# Patient Record
Sex: FEMALE | Race: WHITE | NOT HISPANIC OR LATINO
[De-identification: names, ages, dates, MRNs, and addresses within clinical notes are randomized per-mention and may not be internally consistent; named-entity substitution may affect disease eponyms.]

---

## 2017-01-03 ENCOUNTER — APPOINTMENT (OUTPATIENT)
Dept: ORTHOPEDIC SURGERY | Facility: CLINIC | Age: 62
End: 2017-01-03

## 2017-10-04 ENCOUNTER — APPOINTMENT (OUTPATIENT)
Dept: ULTRASOUND IMAGING | Facility: IMAGING CENTER | Age: 62
End: 2017-10-04
Payer: COMMERCIAL

## 2017-10-04 ENCOUNTER — OUTPATIENT (OUTPATIENT)
Dept: OUTPATIENT SERVICES | Facility: HOSPITAL | Age: 62
LOS: 1 days | End: 2017-10-04
Payer: COMMERCIAL

## 2017-10-04 ENCOUNTER — APPOINTMENT (OUTPATIENT)
Dept: MAMMOGRAPHY | Facility: IMAGING CENTER | Age: 62
End: 2017-10-04
Payer: COMMERCIAL

## 2017-10-04 DIAGNOSIS — Z00.8 ENCOUNTER FOR OTHER GENERAL EXAMINATION: ICD-10-CM

## 2017-10-04 PROCEDURE — G0202: CPT | Mod: 26

## 2017-10-04 PROCEDURE — 76641 ULTRASOUND BREAST COMPLETE: CPT

## 2017-10-04 PROCEDURE — 76641 ULTRASOUND BREAST COMPLETE: CPT | Mod: 26,50

## 2017-10-04 PROCEDURE — 77067 SCR MAMMO BI INCL CAD: CPT

## 2017-10-04 PROCEDURE — 77063 BREAST TOMOSYNTHESIS BI: CPT | Mod: 26

## 2017-10-04 PROCEDURE — 77063 BREAST TOMOSYNTHESIS BI: CPT

## 2017-10-25 ENCOUNTER — RESULT REVIEW (OUTPATIENT)
Age: 62
End: 2017-10-25

## 2018-02-09 ENCOUNTER — TRANSCRIPTION ENCOUNTER (OUTPATIENT)
Age: 63
End: 2018-02-09

## 2018-06-21 ENCOUNTER — EMERGENCY (EMERGENCY)
Facility: HOSPITAL | Age: 63
LOS: 1 days | End: 2018-06-21
Attending: EMERGENCY MEDICINE
Payer: COMMERCIAL

## 2018-06-21 VITALS
DIASTOLIC BLOOD PRESSURE: 72 MMHG | HEART RATE: 103 BPM | RESPIRATION RATE: 22 BRPM | OXYGEN SATURATION: 97 % | SYSTOLIC BLOOD PRESSURE: 115 MMHG

## 2018-06-21 LAB
ALBUMIN SERPL ELPH-MCNC: 4.3 G/DL — SIGNIFICANT CHANGE UP (ref 3.3–5)
ALP SERPL-CCNC: 83 U/L — SIGNIFICANT CHANGE UP (ref 40–120)
ALT FLD-CCNC: 20 U/L — SIGNIFICANT CHANGE UP (ref 10–45)
ANION GAP SERPL CALC-SCNC: 10 MMOL/L — SIGNIFICANT CHANGE UP (ref 5–17)
APPEARANCE UR: CLEAR — SIGNIFICANT CHANGE UP
APTT BLD: 28.3 SEC — SIGNIFICANT CHANGE UP (ref 27.5–37.4)
AST SERPL-CCNC: 26 U/L — SIGNIFICANT CHANGE UP (ref 10–40)
BASOPHILS # BLD AUTO: 0.1 K/UL — SIGNIFICANT CHANGE UP (ref 0–0.2)
BASOPHILS NFR BLD AUTO: 1.1 % — SIGNIFICANT CHANGE UP (ref 0–2)
BILIRUB SERPL-MCNC: 0.4 MG/DL — SIGNIFICANT CHANGE UP (ref 0.2–1.2)
BILIRUB UR-MCNC: NEGATIVE — SIGNIFICANT CHANGE UP
BUN SERPL-MCNC: 24 MG/DL — HIGH (ref 7–23)
CALCIUM SERPL-MCNC: 9.3 MG/DL — SIGNIFICANT CHANGE UP (ref 8.4–10.5)
CHLORIDE SERPL-SCNC: 103 MMOL/L — SIGNIFICANT CHANGE UP (ref 96–108)
CO2 SERPL-SCNC: 29 MMOL/L — SIGNIFICANT CHANGE UP (ref 22–31)
COLOR SPEC: SIGNIFICANT CHANGE UP
CREAT SERPL-MCNC: 0.92 MG/DL — SIGNIFICANT CHANGE UP (ref 0.5–1.3)
DIFF PNL FLD: ABNORMAL
EOSINOPHIL # BLD AUTO: 0.1 K/UL — SIGNIFICANT CHANGE UP (ref 0–0.5)
EOSINOPHIL NFR BLD AUTO: 2.8 % — SIGNIFICANT CHANGE UP (ref 0–6)
ETHANOL SERPL-MCNC: SIGNIFICANT CHANGE UP MG/DL (ref 0–10)
GAS PNL BLDV: SIGNIFICANT CHANGE UP
GLUCOSE SERPL-MCNC: 105 MG/DL — HIGH (ref 70–99)
GLUCOSE UR QL: NEGATIVE — SIGNIFICANT CHANGE UP
HCT VFR BLD CALC: 42.1 % — SIGNIFICANT CHANGE UP (ref 34.5–45)
HGB BLD-MCNC: 14.7 G/DL — SIGNIFICANT CHANGE UP (ref 11.5–15.5)
INR BLD: 1.01 RATIO — SIGNIFICANT CHANGE UP (ref 0.88–1.16)
KETONES UR-MCNC: NEGATIVE — SIGNIFICANT CHANGE UP
LEUKOCYTE ESTERASE UR-ACNC: NEGATIVE — SIGNIFICANT CHANGE UP
LYMPHOCYTES # BLD AUTO: 1.2 K/UL — SIGNIFICANT CHANGE UP (ref 1–3.3)
LYMPHOCYTES # BLD AUTO: 25.8 % — SIGNIFICANT CHANGE UP (ref 13–44)
MCHC RBC-ENTMCNC: 32.4 PG — SIGNIFICANT CHANGE UP (ref 27–34)
MCHC RBC-ENTMCNC: 35 GM/DL — SIGNIFICANT CHANGE UP (ref 32–36)
MCV RBC AUTO: 92.7 FL — SIGNIFICANT CHANGE UP (ref 80–100)
MONOCYTES # BLD AUTO: 0.5 K/UL — SIGNIFICANT CHANGE UP (ref 0–0.9)
MONOCYTES NFR BLD AUTO: 10.1 % — SIGNIFICANT CHANGE UP (ref 2–14)
NEUTROPHILS # BLD AUTO: 2.8 K/UL — SIGNIFICANT CHANGE UP (ref 1.8–7.4)
NEUTROPHILS NFR BLD AUTO: 60.3 % — SIGNIFICANT CHANGE UP (ref 43–77)
NITRITE UR-MCNC: NEGATIVE — SIGNIFICANT CHANGE UP
PH UR: 7.5 — SIGNIFICANT CHANGE UP (ref 5–8)
PLATELET # BLD AUTO: 242 K/UL — SIGNIFICANT CHANGE UP (ref 150–400)
POTASSIUM SERPL-MCNC: 3.8 MMOL/L — SIGNIFICANT CHANGE UP (ref 3.5–5.3)
POTASSIUM SERPL-SCNC: 3.8 MMOL/L — SIGNIFICANT CHANGE UP (ref 3.5–5.3)
PROT SERPL-MCNC: 7 G/DL — SIGNIFICANT CHANGE UP (ref 6–8.3)
PROT UR-MCNC: SIGNIFICANT CHANGE UP
PROTHROM AB SERPL-ACNC: 11 SEC — SIGNIFICANT CHANGE UP (ref 9.8–12.7)
RBC # BLD: 4.54 M/UL — SIGNIFICANT CHANGE UP (ref 3.8–5.2)
RBC # FLD: 11.3 % — SIGNIFICANT CHANGE UP (ref 10.3–14.5)
SODIUM SERPL-SCNC: 142 MMOL/L — SIGNIFICANT CHANGE UP (ref 135–145)
SP GR SPEC: >1.03 — HIGH (ref 1.01–1.02)
TROPONIN T, HIGH SENSITIVITY RESULT: <6 NG/L — SIGNIFICANT CHANGE UP (ref 0–51)
UROBILINOGEN FLD QL: NEGATIVE — SIGNIFICANT CHANGE UP
WBC # BLD: 4.6 K/UL — SIGNIFICANT CHANGE UP (ref 3.8–10.5)
WBC # FLD AUTO: 4.6 K/UL — SIGNIFICANT CHANGE UP (ref 3.8–10.5)

## 2018-06-21 PROCEDURE — 70551 MRI BRAIN STEM W/O DYE: CPT | Mod: 26

## 2018-06-21 PROCEDURE — 70498 CT ANGIOGRAPHY NECK: CPT | Mod: 26

## 2018-06-21 PROCEDURE — 99220: CPT

## 2018-06-21 PROCEDURE — 99281 EMR DPT VST MAYX REQ PHY/QHP: CPT

## 2018-06-21 PROCEDURE — 70496 CT ANGIOGRAPHY HEAD: CPT | Mod: 26

## 2018-06-21 RX ORDER — ASPIRIN/CALCIUM CARB/MAGNESIUM 324 MG
81 TABLET ORAL DAILY
Qty: 0 | Refills: 0 | Status: DISCONTINUED | OUTPATIENT
Start: 2018-06-21 | End: 2018-06-25

## 2018-06-21 NOTE — ED PROVIDER NOTE - MEDICAL DECISION MAKING DETAILS
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 more confused than normal, noticed a gait abnormality, not answering questions appropriately, unable to recall door code, unable to open door, brought pt to ED for eval, colleagues report pt waxing and waning, sx are episodic, pt unable to recall phone password, vss well appearing, A&Ox3 answering basic questions though per colleagues more sluggish than normal, no e/o FND coordination normal, c/f tia code stroke called, will get labs urine cth tox reassess 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 When pt arrived, colleagues were concerned that pt seemed more confused than normal, noticed a gait abnormality, not answering questions appropriately, unable to recall a code to open her cell phone, appeared to not know how to open a door, brought pt to ED for eval. STROKE CODE called on arrival. DDx: CVA, TGA, metabolic d/o, infectious d/o, tox. Plan: basic labs, u/a, ucx, metabolic / tox labs, CTH, Neuro consultation

## 2018-06-21 NOTE — ED PROVIDER NOTE - OBJECTIVE STATEMENT
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 Pt denies sx, feels as if normal, no prior hx of CVA, no vision changes.  Denies n/v/f/c/cp/sob. Denies headache, syncope, lightheadedness, dizziness. Denies chest palpitations, abdominal pain. Denies dysuria, hematuria, hematochezia, BRBPR, tarry stools, diarrhea, constipation.

## 2018-06-21 NOTE — ED ADULT NURSE REASSESSMENT NOTE - NS ED NURSE REASSESS COMMENT FT1
13.00 Received the  Pt from  Main ED to R/O stroke. Pt is awaiting for MRI Pt is A&OX4 has steady gait obeys commands Jessica CP SOB N/V/D fever chills CP SOB headache dizziness  pt is oriented to the unit . meals provided  Comfort care & safety measures  initiated pt is awaiting for MRI
Received female alert and oriented x3 unable to recall what previously happened. Pt ambulated to the bathroom independently and reported she felt dizzy when she was in the bathroom. As per pt the dizziness has resolved. Equal strength noted  in all extremities . Vitals signs stable; awaiting disposition.
Report received from Melany HERNANDEZ at 11am. Pt A&Ox3 with family at bedside. Present to ED with AMS. Code Stroke called. CT head negative for bleed. Pt to get MR head in CDU. PERRLA 3mm, face symmetrical, extremities strong, sensation intact and VSS. Please refer to neuro flowsheet in paper chart for neuro checks. Report given to Neil HERNANDEZ in CDU.
Pt received from DAVID Trejo. Pt oriented to CDU & plan of care was discussed. Pt AO4. Neuro check intact. No deficits noted. Pt answering questions correctly. Memory is still slightly foggy regarding events of the day. Safety & comfort measures maintained. Call bell in reach. Will continue to monitor.

## 2018-06-21 NOTE — ED CDU PROVIDER INITIAL DAY NOTE - OBJECTIVE STATEMENT
61 y/o F with h/o anxiety d/o, Surgery PA, BIB colleagues with episode of AMS at ~07:00. Patient's colleagues state that pt had difficulty with memory, she forgot password to the door, couldn't remember the password for her cellphone, was unsure of her own birthdate, also stated pt had a gait abnormality Symptoms were waxing and waning, episodic in nature, patient currently does not recall these episodes. Pt denies fevers, chills, numbness, paresthesias, weakness, dizziness, falls/trauma, difficulty swallowing, HA, nuchal rigidity, syncope, change in vision, chest pain, back pain, SOB, cough, abdominal pain, n/v/d, dysuria, hematuria, change in stools, incontinence, h/o stroke, TIA, MI, clots, cardiac arrhythmia, neurologic dz, seizure, dementia, recent concussion, recent illness, or prior episodes.   At the time of my exam, patient was symptom free- she denied any current gait abnormality, imbalance, unsteadiness, lightheadedness, or dizziness.  ED course: Code stroke activated. CBC wnl, CMP wnl, Gluc wnl, VBG wnl, UA wnl, Tox neg, etoh neg, CT brain wnl, CTA head/neck wnl. Pt seen by neurology-recommend 24 hour observation and MRI brain. Pt in NAD, comfortable, stable with no active sxs when seen in the ED. Sent to CDU for frequent re-eval, Tele, neuro checks, MRI, and overnight observation. 61 y/o F with h/o anxiety d/o, Surgery PA, BIB colleagues with episode of AMS at ~07:00. Patient's colleagues state that pt had difficulty with memory, she forgot password to the door, couldn't remember the password for her cellphone, was unsure of her own birthdate, also stated pt had a gait abnormality Symptoms were waxing and waning, episodic in nature, patient currently does not recall these episodes. Pt denies fevers, chills, numbness, paresthesias, weakness, dizziness, falls/trauma, difficulty swallowing, HA, nuchal rigidity, syncope, change in vision, chest pain, back pain, SOB, cough, abdominal pain, n/v/d, dysuria, hematuria, change in stools, incontinence, h/o stroke, TIA, MI, clots, cardiac arrhythmia, neurologic dz, seizure, dementia, recent concussion, recent illness, or prior episodes.   At the time of my exam, patient was symptom free- she denied any current gait abnormality, imbalance, unsteadiness, lightheadedness, or dizziness.  ED course: Code stroke activated. CBC wnl, CMP wnl, Gluc wnl, VBG wnl, UA wnl, Tox neg, etoh neg, CT brain wnl, CTA head/neck wnl. Pt seen by neurology-recommend 24 hour observation and MRI brain. Pt in NAD, comfortable, stable with no active sxs when seen in the ED. Sent to CDU for frequent re-eval, Tele, neuro checks, MRI, and overnight observation.  PCP: Katie Pagan

## 2018-06-21 NOTE — ED PROVIDER NOTE - PROGRESS NOTE DETAILS
Jennifer TORRES: Patient signed out pending u/a, utox, neurology recommendations. Patient with hx of anxiety and here for AMS/ confusion. No focal findings on CTA Head/ Neck. Jennifer TORRES: Patient seen by neurology, recommend MRI and 24 hour observation. Patient improved in her symptoms, now able to use phone etc. States she took Adderall this morning but has never had a reaction. Neuro working diagnosis is Transient Global Amnesia. Patient to be evaluated by CDU for observation and MRI. Patient placed in CDU for MRI, neuro re-eval and 24 obs.

## 2018-06-21 NOTE — ED CDU PROVIDER INITIAL DAY NOTE - PHYSICAL EXAMINATION
Pt in NAD, A&O x3. CN 2-12 grossly intact. Head NCAT, sclera white w/o injection PERRLA, EOMI. Nares patent, turbinates w/o edema or erythema, no polyps or septal deviation. No auricular tenderness, TMs pearly grey, some clear fluid behind Right TM. Mouth and pharynx w/o erythema or exudates, uvula midline, no tonsillar enlargement. Trachea midline, no lymphadenopathy, no JVD. No carotid bruits. No retractions or chest wall deformities. No chest wall tenderness, CTA anterior and posterior b/l, no wheezes, rales, or rhonchi. RRR clear distinct S1, S2, no S3, S4, murmurs, gallops, or rubs. Abdomen has no obvious deformities, lesions, or pulsations. NABS all 4 quadrants, no bruits, abdomen soft, non-tender, no organomegaly or masses. No CVAT b/l. 2+ carotid, radial, ulnar, dorsalis pedis, and posterior tibial pulses. No edema or tenderness of the lower extremities. No joint erythema or obvious deformities. Spine without any obvious scoliosis, kyphosis, or lordosis. No midline or paraspinal tenderness. FROM w/o pain of spine, upper and lower extremities b/l. Normal and equal sensation UE, LE and face b/l. 5/5 strength upper and lower extremity b/l.  Romberg negative, pronator drift negative. Normal gait and gross cerebellar functioning: point-to-point and rapid alternating movements. No rashes noted.

## 2018-06-21 NOTE — ED CDU PROVIDER INITIAL DAY NOTE - PROGRESS NOTE DETAILS
Pt seen at bedside. Pt in NAD, comfortable. VS stable from last reading. I was contacted by tele because pt had 3 episodes of dropped beats read as 2nd degree AV block, no other events on tele. Pt has no complaints at this time. Patient seen and evaluated at bedside upon return from MRI. Patient resting in bed comfortably in NAD. No complaints. Most recent VSS. Patient with some persistent memory loss but per sister at bedside, patient improved from earlier. Neuro exam unremarkable. No events on telemetry monitor. Plan for 24hr tele and pending MRI results. Patient seen and evaluated at bedside upon return from MRI. Patient resting in bed comfortably in NAD. No complaints. Denies any vision changes, difficulty speaking/swallowing, numbness, tingling, weakness, unsteady gait. Most recent VSS. Patient with some persistent memory loss but per sister at bedside, patient improved from earlier. Neuro exam unremarkable. No events on telemetry monitor. Plan for 24hr tele and pending MRI results.

## 2018-06-21 NOTE — ED CDU PROVIDER DISPOSITION NOTE - PLAN OF CARE
Rest. Keep self well hydrated. Continue home medications as prescribed.   Follow up with your primary care provider and Neurologist,  -- in 24-48 hours. Take copy of your printed results with you to your appointment.   You may follow up with [facility] ([id number]) located at [address] [place] [address] within 2-3 days.    Stroke education packet was given to you for further information.  Return to ER for any weakness/dizziness, numbness/tingling, change in speech or vision, memory problems, problems walking or any other concerns.

## 2018-06-21 NOTE — ED CDU PROVIDER DISPOSITION NOTE - CLINICAL COURSE
63 y/o F with h/o anxiety d/o, Surgery PA, BIB colleagues with episode of AMS at ~07:00. Patient's colleagues state that pt had difficulty with memory, she forgot password to the door, couldn't remember the password for her cellphone, was unsure of her own birthdate, also stated pt had a gait abnormality Symptoms were waxing and waning, episodic in nature, patient currently does not recall these episodes. Pt denies fevers, chills, numbness, paresthesias, weakness, dizziness, falls/trauma, difficulty swallowing, HA, nuchal rigidity, syncope, change in vision, chest pain, back pain, SOB, cough, abdominal pain, n/v/d, dysuria, hematuria, change in stools, incontinence, h/o stroke, TIA, MI, clots, cardiac arrhythmia, neurologic dz, seizure, dementia, recent concussion, recent illness, or prior episodes.   At the time of my exam, patient was symptom free- she denied any current gait abnormality, imbalance, unsteadiness, lightheadedness, or dizziness.  ED course: Code stroke activated. CBC wnl, CMP wnl, Gluc wnl, VBG wnl, UA wnl, Tox neg, etoh neg, CT brain wnl, CTA head/neck wnl. Pt seen by neurology-recommend 24 hour observation and MRI brain. Pt in NAD, comfortable, stable with no active sxs when seen in the ED. Sent to CDU for frequent re-eval, Tele, neuro checks, MRI, and overnight observation. 63 y/o F with h/o anxiety d/o, Surgery PA, BIB colleagues with episode of AMS at ~07:00. Patient's colleagues state that pt had difficulty with memory, she forgot password to the door, couldn't remember the password for her cellphone, was unsure of her own birthdate, also stated pt had a gait abnormality Symptoms were waxing and waning, episodic in nature, patient currently does not recall these episodes. Pt denies fevers, chills, numbness, paresthesias, weakness, dizziness, falls/trauma, difficulty swallowing, HA, nuchal rigidity, syncope, change in vision, chest pain, back pain, SOB, cough, abdominal pain, n/v/d, dysuria, hematuria, change in stools, incontinence, h/o stroke, TIA, MI, clots, cardiac arrhythmia, neurologic dz, seizure, dementia, recent concussion, recent illness, or prior episodes.   At the time of my exam, patient was symptom free- she denied any current gait abnormality, imbalance, unsteadiness, lightheadedness, or dizziness.  ED course: Code stroke activated. CBC wnl, CMP wnl, Gluc wnl, VBG wnl, UA wnl, Tox neg, etoh neg, CT brain wnl, CTA head/neck wnl. Pt seen by neurology-recommend 24 hour observation and MRI brain. Pt in NAD, comfortable, stable with no active sxs when seen in the ED. Sent to CDU for frequent re-eval, Tele, neuro checks, MRI, and overnight observation. MRI negative. Patient with no events on telemetry overnight in CDU. Patient re-evaluated in AM..... 61 y/o F with h/o anxiety d/o, Surgery PA, BIB colleagues with episode of AMS at ~07:00. Patient's colleagues state that pt had difficulty with memory, she forgot password to the door, couldn't remember the password for her cellphone, was unsure of her own birthdate, also stated pt had a gait abnormality Symptoms were waxing and waning, episodic in nature, patient currently does not recall these episodes. Pt denies fevers, chills, numbness, paresthesias, weakness, dizziness, falls/trauma, difficulty swallowing, HA, nuchal rigidity, syncope, change in vision, chest pain, back pain, SOB, cough, abdominal pain, n/v/d, dysuria, hematuria, change in stools, incontinence, h/o stroke, TIA, MI, clots, cardiac arrhythmia, neurologic dz, seizure, dementia, recent concussion, recent illness, or prior episodes.   At the time of my exam, patient was symptom free- she denied any current gait abnormality, imbalance, unsteadiness, lightheadedness, or dizziness.  ED course: Code stroke activated. CBC wnl, CMP wnl, Gluc wnl, VBG wnl, UA wnl, Tox neg, etoh neg, CT brain wnl, CTA head/neck wnl. Pt seen by neurology-recommend 24 hour observation and MRI brain. Pt in NAD, comfortable, stable with no active sxs when seen in the ED. Sent to CDU for frequent re-eval, Tele, neuro checks, MRI, and overnight observation. MRI negative. Patient with no events on telemetry overnight in CDU. Patient re-evaluated in AM by neurology and cleared for d/c after return to baseline

## 2018-06-21 NOTE — CONSULT NOTE ADULT - SUBJECTIVE AND OBJECTIVE BOX
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  On arrival to ED, code stroke called, NIHSS 0, MRS 0, tPA not administered as patient asymptomatic.        Subjective:    Review of Systems:  Constitutional:        Eyes, Ears, Mouth, Throat:   Respiratory:                            Cardiovascular:   Gastrointestinal:                                     Genitourinary:   Musculoskeletal:                                    Dermatologic:   Neurological: as per above                                                                 Psychiatric:   Endocrine:              Hematologic/Lymphatic:     MEDICATIONS  (STANDING):    MEDICATIONS  (PRN):      Allergies    Allergy Status Unknown    Intolerances      PAST MEDICAL & SURGICAL HISTORY:  Anxiety  No significant past surgical history    FH:  SH:    Objective:   Vital Signs Last 24 Hrs  T(C): 36.6 (21 Jun 2018 06:59), Max: 36.6 (21 Jun 2018 06:59)  T(F): 97.9 (21 Jun 2018 06:59), Max: 97.9 (21 Jun 2018 06:59)  HR: 92 (21 Jun 2018 06:59) (92 - 103)  BP: 127/77 (21 Jun 2018 06:59) (115/72 - 127/77)  BP(mean): --  RR: 24 (21 Jun 2018 06:59) (20 - 24)  SpO2: 97% (21 Jun 2018 06:59) (96% - 97%)    General Exam:   General appearance: No acute distress                 General Adult Exam    Neurological Exam:  Mental Status: Orientated to self, date and place.  Attention intact.  No dysarthria, aphasia or neglect.  Knowledge intact.  Registration intact.  Short and long term memory grossly intact.      Cranial Nerves: CN I - not tested.  PERRL, EOMI, VFF, no nystagmus or diplopia.  No APD.  Fundi not visualized bilaterally.  CN V1-3 intact to light touch and pinprick.  No facial asymmetry.  Hearing intact to finger rub bilaterally.  Tongue, uvula and palate midline.  Sternocleidomastoid and Trapezius intact bilaterally.    Motor:   Tone: normal.                  Strength:     [] Upper extremity                      Delt       Bicep    Tricep                                                  R         5/5        5/5        5/5       5/5                                               L          5/5 5/5 5/5 5/5  [] Lower extremity                       HF          KE          KF        DF         PF                                               R        5/5 5/5 5/5 5/5 5/5                                               L         5/5 5/5 5/5 5/5 5/5  Pronator drift: none                 Dysmetria: None to finger-nose-finger or heel-shin-heel  No truncal ataxia.    Tremor: No resting, postural or action tremor.  No myoclonus.    Sensation: intact to light touch, pinprick, vibration and proprioception    Deep Tendon Reflexes: 1+ bilateral biceps, triceps, brachioradialis, knee and ankle  Toes flexor bilaterally    Gait: normal and stable.        Other:                        14.7   4.6   )-----------( 242      ( 21 Jun 2018 06:45 )             42.1     06-21    142  |  103  |  24<H>  ----------------------------<  105<H>  3.8   |  29  |  0.92    Ca    9.3      21 Jun 2018 06:45    TPro  7.0  /  Alb  4.3  /  TBili  0.4  /  DBili  x   /  AST  26  /  ALT  20  /  AlkPhos  83  06-21    LIVER FUNCTIONS - ( 21 Jun 2018 06:45 )  Alb: 4.3 g/dL / Pro: 7.0 g/dL / ALK PHOS: 83 U/L / ALT: 20 U/L / AST: 26 U/L / GGT: x           PT/INR - ( 21 Jun 2018 06:45 )   PT: 11.0 sec;   INR: 1.01 ratio         PTT - ( 21 Jun 2018 06:45 )  PTT:28.3 sec    Radiology    < from: CT Brain Stroke Protocol (06.21.18 @ 07:00) >  IMPRESSION:   NONCONTRAST CT BRAIN: No acute intracranial hemorrhage, mass effect, or   CT evidence of acute territorial infarct.    CTA BRAIN: Patent intracranial circulation. No flow-limiting stenosis or   occlusion.     CTA NECK: Patent cervical vasculature. No flow limiting stenosis or   occlusion.     < end of copied text >

## 2018-06-21 NOTE — CONSULT NOTE ADULT - ASSESSMENT
62 year old Female w/ PMHx anxiety presents with AMS.  Neurological exam non-focal.  S/p code stroke, NIHSS 0, MRS 0, tPA not administered.  CTH, CTA head/neck showing no acute abnormalities or significant stenosis.  Symptoms consistent with transient global amnesia.    Plan:  - MRI brain w/o contrast  - observation for 24 hours to assess patient memory recovery period

## 2018-06-21 NOTE — ED ADULT NURSE NOTE - OBJECTIVE STATEMENT
61 YO female with no pertinent PMH via walk in presenting with complaints of altered mental status. As per coworkers at bedside, patient was found attempting to enter combination to work door multiple times without success. Coworkers state that patient was stating it was 1984 and was confused as per coworkers. Unknown last normal time. Unknown onset of symptoms. At time of [facility] ED arrival, pt AxOx4, NIHSS 0. Pt denies chest pain, shortness of breath, visual disturbances, numbness/tingling, fever, chills, diaphoresis, headache, nausea, vomiting, constipation, diarrhea, or urinary symptoms.   Pt Axox4, gross neuro intact, PERRL 4 mm. Lungs clear throughout bilateral. S1S2 heard. Abdomen soft, non-tender, non-distended. Skin warm, dry, and intact. Safety and comfort measures maintained. family present at bedside.

## 2018-06-21 NOTE — ED CDU PROVIDER INITIAL DAY NOTE - DETAILS
63 y/o F with TGA, no focal neuro deficits, no gait abnormalities/imbalance. Followed by neuro.  -Tele  -Neuro checks q4hrs  -frequent re-eval  -MRI brain  -observe 24 hours from presentation at 07:20 as per Neuro

## 2018-06-21 NOTE — CONSULT NOTE ADULT - ATTENDING COMMENTS
I performed a history and physical examination of the patient and discussed the management of the patient with the resident. I reviewed the resident's note and agree with the documented findings and plan of care with the following additions/exceptions.    She reports lack of cognitive issues at baseline. 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 Woke normal yesterday aside from feeling sweaty that she attributed to her AC not being on. 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 She does not recall driving to work that morning or the symptoms she was told she was experiencing, including being confused over her phone password and other examples as noted above. She doesn't know if she repetitively asked the same questions. She feels back to normal. She says she also takes xanax once every 2 weeks, prescribed by PCP, for anxiety, but didn't take it yesterday. Utox was negative. She denies feeling diplopia, vertigo, numbness, tingling, weakness currently or yesterday.    On exam she appeared anxious and a little guarded. Alert, fluent, no anomia in conversation, intact comprehension. eomi, face symmetric, tongue midline, no drift, normal tone, ftn intact without dysmetria, ffm intact, steady gait, romberg negative, can tandem.    mri was normal and I personally reviewed and agree it apeprs normal, swi neg    I don't think the history is consistent with transient global amnesia, but it was a transient event. I wonder if it is related to the adderall use and I counseled her to stop using adderall and xanax and to d/w PCP referral to psychiatry for more comprehensive anxiety mgmt. Adderall can cause anxiety so also doesn't make sense that she would have taken it for the purpose of reducing her anxiety, which I counseled her on. Adderall can cause seizures, this event potentially could have been complex partial seizure, so again would recommend she stop taking it. She was worried about alzheimer's disease given that her father developed it (late onset) but we discussed how there isnt any indication of that based on lack of symptoms aside from this transient event, and mri although of course not diagnostic for AD was reassuring in terms of lack of any focal atrophy. recommended she have her hematuria followed up, she reports she has had this before and will d/w her PCP.

## 2018-06-21 NOTE — ED CDU PROVIDER INITIAL DAY NOTE - ATTENDING CONTRIBUTION TO CARE
MD Banda:  I have personally performed a face to face diagnostic evaluation on this patient with the PA.  I have reviewed the ACP note and agree with the history, exam, and plan of care, except as noted.  History and Exam by me shows 62F, c/o ~ 1-2 hrs episode of amnesia w/o clear etiology.  Code stroke in the ED.  No evidence of injury on CT.  High suspicion for TGA.  Sent to CDU for MRIs.  VS, labs, all wnl while in the Ed/CDU. MRI unremarkable.  Physical Exam: adult F, NAD, AAOx3, strength 5/5 throughout.  Ambulates w/o difficulty.  Impression:  TGA, f/u Neurology &/or PMD.  Strict return precautions.

## 2018-06-22 VITALS
DIASTOLIC BLOOD PRESSURE: 73 MMHG | HEART RATE: 80 BPM | TEMPERATURE: 98 F | RESPIRATION RATE: 17 BRPM | SYSTOLIC BLOOD PRESSURE: 123 MMHG | OXYGEN SATURATION: 97 %

## 2018-06-22 LAB
CHOLEST SERPL-MCNC: 164 MG/DL — SIGNIFICANT CHANGE UP (ref 10–199)
HBA1C BLD-MCNC: 5.2 % — SIGNIFICANT CHANGE UP (ref 4–5.6)
HDLC SERPL-MCNC: 58 MG/DL — SIGNIFICANT CHANGE UP (ref 40–125)
LIPID PNL WITH DIRECT LDL SERPL: 89 MG/DL — SIGNIFICANT CHANGE UP
TOTAL CHOLESTEROL/HDL RATIO MEASUREMENT: 2.8 RATIO — LOW (ref 3.3–7.1)
TRIGL SERPL-MCNC: 83 MG/DL — SIGNIFICANT CHANGE UP (ref 10–149)

## 2018-06-22 PROCEDURE — 99285 EMERGENCY DEPT VISIT HI MDM: CPT | Mod: 25

## 2018-06-22 PROCEDURE — 80061 LIPID PANEL: CPT

## 2018-06-22 PROCEDURE — 82330 ASSAY OF CALCIUM: CPT

## 2018-06-22 PROCEDURE — 83605 ASSAY OF LACTIC ACID: CPT

## 2018-06-22 PROCEDURE — 93005 ELECTROCARDIOGRAM TRACING: CPT

## 2018-06-22 PROCEDURE — 80307 DRUG TEST PRSMV CHEM ANLYZR: CPT

## 2018-06-22 PROCEDURE — 85027 COMPLETE CBC AUTOMATED: CPT

## 2018-06-22 PROCEDURE — 80053 COMPREHEN METABOLIC PANEL: CPT

## 2018-06-22 PROCEDURE — 84295 ASSAY OF SERUM SODIUM: CPT

## 2018-06-22 PROCEDURE — 82565 ASSAY OF CREATININE: CPT

## 2018-06-22 PROCEDURE — 70551 MRI BRAIN STEM W/O DYE: CPT

## 2018-06-22 PROCEDURE — 85014 HEMATOCRIT: CPT

## 2018-06-22 PROCEDURE — 70498 CT ANGIOGRAPHY NECK: CPT

## 2018-06-22 PROCEDURE — 81001 URINALYSIS AUTO W/SCOPE: CPT

## 2018-06-22 PROCEDURE — 85610 PROTHROMBIN TIME: CPT

## 2018-06-22 PROCEDURE — 84484 ASSAY OF TROPONIN QUANT: CPT

## 2018-06-22 PROCEDURE — 70496 CT ANGIOGRAPHY HEAD: CPT

## 2018-06-22 PROCEDURE — 70450 CT HEAD/BRAIN W/O DYE: CPT

## 2018-06-22 PROCEDURE — 82435 ASSAY OF BLOOD CHLORIDE: CPT

## 2018-06-22 PROCEDURE — 82947 ASSAY GLUCOSE BLOOD QUANT: CPT

## 2018-06-22 PROCEDURE — G0378: CPT

## 2018-06-22 PROCEDURE — 82803 BLOOD GASES ANY COMBINATION: CPT

## 2018-06-22 PROCEDURE — 85730 THROMBOPLASTIN TIME PARTIAL: CPT

## 2018-06-22 PROCEDURE — 84132 ASSAY OF SERUM POTASSIUM: CPT

## 2018-06-22 PROCEDURE — 83036 HEMOGLOBIN GLYCOSYLATED A1C: CPT

## 2018-06-22 PROCEDURE — 99217: CPT

## 2018-06-22 PROCEDURE — 82962 GLUCOSE BLOOD TEST: CPT

## 2018-06-22 NOTE — ED CDU PROVIDER SUBSEQUENT DAY NOTE - PROGRESS NOTE DETAILS
Patient asleep, resting comfortably in NAD. Most recent VSS. No events noted on telemetry. CDU NOTE DAVID Dumas: VSS NAD. Patient is resting comfortably and is without any complaints. States she feels back to baseline, but does not remember the events which took place yesterday. Patient spoke with her sister earlier today who agreed that she was returning to baseline. Neuro exam remains unchanged, no acute events on tele. Spoke to neuro this morning who reviewed MRIs which resulted as normal, stated believes pt experienced TGA w/ expectant full recovery to baseline., Cleared from d/c from their perspective with follow up Patient seen and evaluated by Dr. Banda, neuro attending has evaluated team, cleared for d/c. Dr. Banda aware   Fany Dumas PA-C

## 2018-06-22 NOTE — ED CDU PROVIDER SUBSEQUENT DAY NOTE - HISTORY
No interval change since initial CDU note. Patient asleep, resting comfortably. NAD. Most recent VSS. No events on telemetry. - Zainab Prasad PA-C

## 2018-09-22 PROBLEM — F41.9 ANXIETY DISORDER, UNSPECIFIED: Chronic | Status: ACTIVE | Noted: 2018-06-21

## 2018-10-23 ENCOUNTER — APPOINTMENT (OUTPATIENT)
Dept: RADIOLOGY | Facility: IMAGING CENTER | Age: 63
End: 2018-10-23
Payer: COMMERCIAL

## 2018-10-23 ENCOUNTER — OUTPATIENT (OUTPATIENT)
Dept: OUTPATIENT SERVICES | Facility: HOSPITAL | Age: 63
LOS: 1 days | End: 2018-10-23
Payer: COMMERCIAL

## 2018-10-23 ENCOUNTER — APPOINTMENT (OUTPATIENT)
Dept: MAMMOGRAPHY | Facility: IMAGING CENTER | Age: 63
End: 2018-10-23
Payer: COMMERCIAL

## 2018-10-23 DIAGNOSIS — Z00.8 ENCOUNTER FOR OTHER GENERAL EXAMINATION: ICD-10-CM

## 2018-10-23 PROCEDURE — 77080 DXA BONE DENSITY AXIAL: CPT

## 2018-10-23 PROCEDURE — 77067 SCR MAMMO BI INCL CAD: CPT | Mod: 26

## 2018-10-23 PROCEDURE — 77080 DXA BONE DENSITY AXIAL: CPT | Mod: 26

## 2018-10-23 PROCEDURE — 77067 SCR MAMMO BI INCL CAD: CPT

## 2018-10-23 PROCEDURE — 77063 BREAST TOMOSYNTHESIS BI: CPT

## 2018-10-23 PROCEDURE — 77063 BREAST TOMOSYNTHESIS BI: CPT | Mod: 26

## 2018-11-29 ENCOUNTER — RESULT REVIEW (OUTPATIENT)
Age: 63
End: 2018-11-29

## 2020-01-29 ENCOUNTER — APPOINTMENT (OUTPATIENT)
Dept: ULTRASOUND IMAGING | Facility: IMAGING CENTER | Age: 65
End: 2020-01-29
Payer: COMMERCIAL

## 2020-01-29 ENCOUNTER — OUTPATIENT (OUTPATIENT)
Dept: OUTPATIENT SERVICES | Facility: HOSPITAL | Age: 65
LOS: 1 days | End: 2020-01-29
Payer: COMMERCIAL

## 2020-01-29 ENCOUNTER — APPOINTMENT (OUTPATIENT)
Dept: MAMMOGRAPHY | Facility: IMAGING CENTER | Age: 65
End: 2020-01-29
Payer: COMMERCIAL

## 2020-01-29 DIAGNOSIS — Z00.8 ENCOUNTER FOR OTHER GENERAL EXAMINATION: ICD-10-CM

## 2020-01-29 PROCEDURE — 77067 SCR MAMMO BI INCL CAD: CPT | Mod: 26

## 2020-01-29 PROCEDURE — 77067 SCR MAMMO BI INCL CAD: CPT

## 2020-01-29 PROCEDURE — 77063 BREAST TOMOSYNTHESIS BI: CPT

## 2020-01-29 PROCEDURE — 77063 BREAST TOMOSYNTHESIS BI: CPT | Mod: 26

## 2020-02-04 ENCOUNTER — RESULT REVIEW (OUTPATIENT)
Age: 65
End: 2020-02-04

## 2020-02-25 ENCOUNTER — APPOINTMENT (OUTPATIENT)
Dept: ORTHOPEDIC SURGERY | Facility: CLINIC | Age: 65
End: 2020-02-25
Payer: COMMERCIAL

## 2020-02-25 DIAGNOSIS — M65.312 TRIGGER THUMB, LEFT THUMB: ICD-10-CM

## 2020-02-25 DIAGNOSIS — S68.119S COMPLETE TRAUMATIC METACARPOPHALANGEAL AMPUTATION OF UNSPECIFIED FINGER, SEQUELA: ICD-10-CM

## 2020-02-25 PROCEDURE — 99203 OFFICE O/P NEW LOW 30 MIN: CPT | Mod: 25

## 2020-02-25 PROCEDURE — 20550 NJX 1 TENDON SHEATH/LIGAMENT: CPT | Mod: FA

## 2020-06-09 NOTE — ED CDU PROVIDER SUBSEQUENT DAY NOTE - CROS ED [facility] ALL NEG
DISPLAY PLAN FREE TEXT
negative...

## 2021-03-23 ENCOUNTER — APPOINTMENT (OUTPATIENT)
Dept: RADIOLOGY | Facility: IMAGING CENTER | Age: 66
End: 2021-03-23
Payer: MEDICARE

## 2021-03-23 ENCOUNTER — APPOINTMENT (OUTPATIENT)
Dept: ULTRASOUND IMAGING | Facility: IMAGING CENTER | Age: 66
End: 2021-03-23
Payer: MEDICARE

## 2021-03-23 ENCOUNTER — OUTPATIENT (OUTPATIENT)
Dept: OUTPATIENT SERVICES | Facility: HOSPITAL | Age: 66
LOS: 1 days | End: 2021-03-23
Payer: MEDICARE

## 2021-03-23 ENCOUNTER — APPOINTMENT (OUTPATIENT)
Dept: MAMMOGRAPHY | Facility: IMAGING CENTER | Age: 66
End: 2021-03-23
Payer: MEDICARE

## 2021-03-23 DIAGNOSIS — Z00.8 ENCOUNTER FOR OTHER GENERAL EXAMINATION: ICD-10-CM

## 2021-03-23 PROCEDURE — 77080 DXA BONE DENSITY AXIAL: CPT | Mod: 26

## 2021-03-23 PROCEDURE — 76641 ULTRASOUND BREAST COMPLETE: CPT | Mod: 26,50

## 2021-03-23 PROCEDURE — 77063 BREAST TOMOSYNTHESIS BI: CPT | Mod: 26

## 2021-03-23 PROCEDURE — 76830 TRANSVAGINAL US NON-OB: CPT | Mod: 26

## 2021-03-23 PROCEDURE — 77067 SCR MAMMO BI INCL CAD: CPT | Mod: 26

## 2021-03-23 PROCEDURE — 76641 ULTRASOUND BREAST COMPLETE: CPT

## 2021-03-23 PROCEDURE — 77067 SCR MAMMO BI INCL CAD: CPT

## 2021-03-23 PROCEDURE — 76856 US EXAM PELVIC COMPLETE: CPT | Mod: 26

## 2021-03-23 PROCEDURE — 76856 US EXAM PELVIC COMPLETE: CPT

## 2021-03-23 PROCEDURE — 77080 DXA BONE DENSITY AXIAL: CPT

## 2021-03-23 PROCEDURE — 77063 BREAST TOMOSYNTHESIS BI: CPT

## 2021-03-23 PROCEDURE — 76830 TRANSVAGINAL US NON-OB: CPT

## 2022-04-12 ENCOUNTER — APPOINTMENT (OUTPATIENT)
Dept: MAMMOGRAPHY | Facility: IMAGING CENTER | Age: 67
End: 2022-04-12
Payer: MEDICARE

## 2022-04-12 ENCOUNTER — OUTPATIENT (OUTPATIENT)
Dept: OUTPATIENT SERVICES | Facility: HOSPITAL | Age: 67
LOS: 1 days | End: 2022-04-12
Payer: MEDICARE

## 2022-04-12 DIAGNOSIS — Z00.8 ENCOUNTER FOR OTHER GENERAL EXAMINATION: ICD-10-CM

## 2022-04-12 PROCEDURE — 77067 SCR MAMMO BI INCL CAD: CPT

## 2022-04-12 PROCEDURE — 77067 SCR MAMMO BI INCL CAD: CPT | Mod: 26

## 2022-04-12 PROCEDURE — 77063 BREAST TOMOSYNTHESIS BI: CPT | Mod: 26

## 2022-04-12 PROCEDURE — 77063 BREAST TOMOSYNTHESIS BI: CPT

## 2022-05-02 ENCOUNTER — APPOINTMENT (OUTPATIENT)
Dept: NEUROLOGY | Facility: CLINIC | Age: 67
End: 2022-05-02
Payer: MEDICARE

## 2022-05-02 VITALS
SYSTOLIC BLOOD PRESSURE: 145 MMHG | HEIGHT: 64 IN | BODY MASS INDEX: 26.98 KG/M2 | DIASTOLIC BLOOD PRESSURE: 90 MMHG | WEIGHT: 158 LBS | HEART RATE: 89 BPM

## 2022-05-02 DIAGNOSIS — R29.90 UNSPECIFIED SYMPTOMS AND SIGNS INVOLVING THE NERVOUS SYSTEM: ICD-10-CM

## 2022-05-02 DIAGNOSIS — Z78.9 OTHER SPECIFIED HEALTH STATUS: ICD-10-CM

## 2022-05-02 DIAGNOSIS — Z87.81 PERSONAL HISTORY OF (HEALED) TRAUMATIC FRACTURE: ICD-10-CM

## 2022-05-02 DIAGNOSIS — R41.3 OTHER AMNESIA: ICD-10-CM

## 2022-05-02 DIAGNOSIS — G47.19 OTHER HYPERSOMNIA: ICD-10-CM

## 2022-05-02 PROCEDURE — 99205 OFFICE O/P NEW HI 60 MIN: CPT | Mod: 25

## 2022-05-02 PROCEDURE — 96116 NUBHVL XM PHYS/QHP 1ST HR: CPT | Mod: 59

## 2022-05-06 ENCOUNTER — OUTPATIENT (OUTPATIENT)
Dept: OUTPATIENT SERVICES | Facility: HOSPITAL | Age: 67
LOS: 1 days | End: 2022-05-06
Payer: MEDICARE

## 2022-05-06 ENCOUNTER — APPOINTMENT (OUTPATIENT)
Dept: MRI IMAGING | Facility: CLINIC | Age: 67
End: 2022-05-06

## 2022-05-06 DIAGNOSIS — R41.3 OTHER AMNESIA: ICD-10-CM

## 2022-05-06 PROCEDURE — G1004: CPT

## 2022-05-06 PROCEDURE — 70551 MRI BRAIN STEM W/O DYE: CPT | Mod: ME

## 2022-05-06 PROCEDURE — 70551 MRI BRAIN STEM W/O DYE: CPT | Mod: 26,ME

## 2022-05-13 LAB
FOLATE SERPL-MCNC: 17.4 NG/ML
T PALLIDUM AB SER QL IA: NEGATIVE
VIT B12 SERPL-MCNC: 1570 PG/ML

## 2022-05-18 ENCOUNTER — OUTPATIENT (OUTPATIENT)
Dept: OUTPATIENT SERVICES | Facility: HOSPITAL | Age: 67
LOS: 1 days | End: 2022-05-18
Payer: MEDICARE

## 2022-05-18 ENCOUNTER — APPOINTMENT (OUTPATIENT)
Dept: ULTRASOUND IMAGING | Facility: IMAGING CENTER | Age: 67
End: 2022-05-18
Payer: MEDICARE

## 2022-05-18 DIAGNOSIS — Z00.8 ENCOUNTER FOR OTHER GENERAL EXAMINATION: ICD-10-CM

## 2022-05-18 PROCEDURE — 76641 ULTRASOUND BREAST COMPLETE: CPT | Mod: 26,50

## 2022-05-18 PROCEDURE — 76641 ULTRASOUND BREAST COMPLETE: CPT

## 2022-05-20 LAB — VIT B1 SERPL-MCNC: 153.9 NMOL/L

## 2022-11-14 ENCOUNTER — APPOINTMENT (OUTPATIENT)
Dept: NEUROLOGY | Facility: CLINIC | Age: 67
End: 2022-11-14

## 2022-11-14 VITALS
HEART RATE: 93 BPM | RESPIRATION RATE: 15 BRPM | BODY MASS INDEX: 25.27 KG/M2 | HEIGHT: 64 IN | SYSTOLIC BLOOD PRESSURE: 163 MMHG | WEIGHT: 148 LBS | DIASTOLIC BLOOD PRESSURE: 98 MMHG

## 2022-11-14 PROCEDURE — 99215 OFFICE O/P EST HI 40 MIN: CPT

## 2022-11-14 RX ORDER — IRON/IRON ASP GLY/FA/MV-MIN 38 125-25-1MG
TABLET ORAL
Refills: 0 | Status: ACTIVE | COMMUNITY

## 2022-11-14 RX ORDER — PNV NO.95/FERROUS FUM/FOLIC AC 28MG-0.8MG
TABLET ORAL
Refills: 0 | Status: ACTIVE | COMMUNITY

## 2022-11-14 NOTE — PHYSICAL EXAM
[General Appearance - Alert] : alert [General Appearance - In No Acute Distress] : in no acute distress [Oriented To Time, Place, And Person] : oriented to person, place, and time [Impaired Insight] : insight and judgment were intact [Affect] : the affect was normal [Person] : oriented to person [Place] : oriented to place [Time] : oriented to time [Remote Intact] : remote memory intact [Registration Intact] : recent registration memory intact [Concentration Intact] : normal concentrating ability [Visual Intact] : visual attention was ~T not ~L decreased [Naming Objects] : no difficulty naming common objects [Repeating Phrases] : no difficulty repeating a phrase [Writing A Sentence] : no difficulty writing a sentence [Fluency] : fluency intact [Comprehension] : comprehension intact [Reading] : reading intact [Past History] : adequate knowledge of personal past history [Vocabulary] : adequate range of vocabulary [Total Score ___ / 30] : the patient achieved a score of [unfilled] /30 [Date / Time ___ / 5] : date / time [unfilled] / 5 [Place ___ / 5] : place [unfilled] / 5 [Registration ___ / 3] : registration [unfilled] / 3 [Serial Sevens ___/5] : serial sevens [unfilled] / 5 [Naming 2 Objects ___ / 2] : naming two objects [unfilled] / 2 [Repeating a Sentence ___ / 1] : repeating a sentence [unfilled] / 1 [Writing a Sentence ___ / 1] : write sentence [unfilled] / 1 [3-stage Verbal Command ___ / 3] : three-stage verbal command [unfilled] / 3 [Written Command ___ / 1] : written command [unfilled] / 1 [Copy a Design ___ / 1] : copy a design [unfilled] / 1 [Recall ___ / 3] : recall [unfilled] / 3 [Cranial Nerves Optic (II)] : visual acuity intact bilaterally,  visual fields full to confrontation, pupils equal round and reactive to light [Cranial Nerves Oculomotor (III)] : extraocular motion intact [Cranial Nerves Trigeminal (V)] : facial sensation intact symmetrically [Cranial Nerves Facial (VII)] : face symmetrical [Cranial Nerves Vestibulocochlear (VIII)] : hearing was intact bilaterally [Cranial Nerves Glossopharyngeal (IX)] : tongue and palate midline [Cranial Nerves Accessory (XI - Cranial And Spinal)] : head turning and shoulder shrug symmetric [Cranial Nerves Hypoglossal (XII)] : there was no tongue deviation with protrusion [Motor Tone] : muscle tone was normal in all four extremities [Motor Strength] : muscle strength was normal in all four extremities [Involuntary Movements] : no involuntary movements were seen [No Muscle Atrophy] : normal bulk in all four extremities [Motor Handedness Right-Handed] : the patient is right hand dominant [Sensation Tactile Decrease] : light touch was intact [Balance] : balance was intact [2+] : Ankle jerk left 2+ [Sclera] : the sclera and conjunctiva were normal [PERRL With Normal Accommodation] : pupils were equal in size, round, reactive to light, with normal accommodation [Extraocular Movements] : extraocular movements were intact [No APD] : no afferent pupillary defect [No SOBIA] : no internuclear ophthalmoplegia [Full Visual Field] : full visual field [Outer Ear] : the ears and nose were normal in appearance [Neck Appearance] : the appearance of the neck was normal [Edema] : there was no peripheral edema [Abnormal Walk] : normal gait [Skin Color & Pigmentation] : normal skin color and pigmentation [] : no rash [Current Events] : inadequate knowledge of current events [Limited Balance] : balance was intact [Past-pointing] : there was no past-pointing [Tremor] : no tremor present [Plantar Reflex Right Only] : normal on the right [Plantar Reflex Left Only] : normal on the left [FreeTextEntry4] : Mental Status Exam\par Presidents: 3/5\par Alternating Pattern: ok\par Spiral: ok\par Clock: 3/3-some hesitation\par Repetition: ok\par \par Trail A: 59"; B: >180", no errors, incomplete\par Fluency: A: 7/min; Animals: 6/min\par \par Go-No-Go:\par \par R/L discrimination on self and examiner:\par Cross-line commands: \par Praxis:\par -Motor:\par -Dynamic/Luria:\par -Ideomotor/Imitation: \par -Ideational/writing/closing-in:\par -Dressing:\par  [FreeTextEntry8] : Good stance and gait, pull ok, pivots in 3 steps, no shuffle; march ok.

## 2022-11-14 NOTE — ASSESSMENT
[FreeTextEntry1] : Assessment:\par 68yo RH WW with STM loss over the last 1-2years.\par Impression of more subcortical deficits, with slow processing.\par no significant progression since last seen, it seems. \par Sleep study requested for daytime sleepiness, but not present at this time, will hold off for now.\par \par Diagnostic Impression:\par -aMCI\par \par Plan:\par -get NPT done\par -consider fdg-pet\par \par A thorough discussion was entertained with the patient/caregiver regarding the use of psychoactive medications, their possible benefits and AE profile, including the risk of cardiovascular complications, including but not limited to applicable black box warning and teratogenicity, where appropriate.\par We discussed the benefits of being active, physically and mentally, and the need to to establish a routine in this respect.\par Driving abilities and firearms possession and use were discussed, in relation to progression of the cognitive decline, and the need to assess them periodically.\par Patient/caregiver advised to bring previous records to this office.\par All questions were answered at the time of the visit. We are certainly available for further discussion as needed.\par Patient/caregiver fully understands and agree with the plan.\par

## 2022-11-14 NOTE — REASON FOR VISIT
[Follow-Up: _____] : a [unfilled] follow-up visit [Family Member] : family member [FreeTextEntry1] : memory loss

## 2023-01-05 ENCOUNTER — APPOINTMENT (OUTPATIENT)
Dept: NEUROLOGY | Facility: CLINIC | Age: 68
End: 2023-01-05
Payer: MEDICARE

## 2023-01-05 PROCEDURE — 96132 NRPSYC TST EVAL PHYS/QHP 1ST: CPT

## 2023-01-05 PROCEDURE — 96133 NRPSYC TST EVAL PHYS/QHP EA: CPT

## 2023-01-05 PROCEDURE — 96116 NUBHVL XM PHYS/QHP 1ST HR: CPT

## 2023-01-05 PROCEDURE — 96137 PSYCL/NRPSYC TST PHY/QHP EA: CPT

## 2023-01-05 PROCEDURE — 96136 PSYCL/NRPSYC TST PHY/QHP 1ST: CPT

## 2023-03-02 ENCOUNTER — APPOINTMENT (OUTPATIENT)
Dept: NEUROLOGY | Facility: CLINIC | Age: 68
End: 2023-03-02

## 2023-03-14 ENCOUNTER — NON-APPOINTMENT (OUTPATIENT)
Age: 68
End: 2023-03-14

## 2023-03-15 ENCOUNTER — APPOINTMENT (OUTPATIENT)
Dept: NEUROLOGY | Facility: CLINIC | Age: 68
End: 2023-03-15
Payer: MEDICARE

## 2023-03-15 PROCEDURE — 96132 NRPSYC TST EVAL PHYS/QHP 1ST: CPT

## 2023-06-19 ENCOUNTER — OUTPATIENT (OUTPATIENT)
Dept: OUTPATIENT SERVICES | Facility: HOSPITAL | Age: 68
LOS: 1 days | End: 2023-06-19
Payer: MEDICARE

## 2023-06-19 ENCOUNTER — APPOINTMENT (OUTPATIENT)
Dept: MAMMOGRAPHY | Facility: IMAGING CENTER | Age: 68
End: 2023-06-19
Payer: MEDICARE

## 2023-06-19 DIAGNOSIS — Z00.8 ENCOUNTER FOR OTHER GENERAL EXAMINATION: ICD-10-CM

## 2023-06-19 PROCEDURE — 77067 SCR MAMMO BI INCL CAD: CPT

## 2023-06-19 PROCEDURE — 77063 BREAST TOMOSYNTHESIS BI: CPT

## 2023-06-19 PROCEDURE — 77063 BREAST TOMOSYNTHESIS BI: CPT | Mod: 26

## 2023-06-19 PROCEDURE — 77067 SCR MAMMO BI INCL CAD: CPT | Mod: 26

## 2023-08-15 ENCOUNTER — APPOINTMENT (OUTPATIENT)
Dept: NEUROLOGY | Facility: CLINIC | Age: 68
End: 2023-08-15

## 2023-12-04 ENCOUNTER — APPOINTMENT (OUTPATIENT)
Dept: NEUROLOGY | Facility: CLINIC | Age: 68
End: 2023-12-04
Payer: MEDICARE

## 2023-12-04 VITALS
HEIGHT: 64 IN | WEIGHT: 153 LBS | BODY MASS INDEX: 26.12 KG/M2 | DIASTOLIC BLOOD PRESSURE: 110 MMHG | SYSTOLIC BLOOD PRESSURE: 173 MMHG | HEART RATE: 103 BPM

## 2023-12-04 DIAGNOSIS — G31.84 MILD COGNITIVE IMPAIRMENT, SO STATED: ICD-10-CM

## 2023-12-04 DIAGNOSIS — F02.A0 OTHER FRONTOTEMPORAL DEMENTIA: ICD-10-CM

## 2023-12-04 DIAGNOSIS — G31.09 OTHER FRONTOTEMPORAL DEMENTIA: ICD-10-CM

## 2023-12-04 PROCEDURE — 99214 OFFICE O/P EST MOD 30 MIN: CPT

## 2023-12-04 RX ORDER — AMOXICILLIN 500 MG/1
500 CAPSULE ORAL
Qty: 56 | Refills: 0 | Status: COMPLETED | COMMUNITY
Start: 2023-08-21

## 2023-12-04 RX ORDER — BROMFENAC SODIUM 0.7 MG/ML
0.07 SOLUTION/ DROPS OPHTHALMIC
Qty: 3 | Refills: 0 | Status: COMPLETED | COMMUNITY
Start: 2023-08-07

## 2023-12-04 RX ORDER — COLESTIPOL HYDROCHLORIDE 1 G/1
1 TABLET, FILM COATED ORAL
Qty: 180 | Refills: 0 | Status: COMPLETED | COMMUNITY
Start: 2023-08-07

## 2023-12-04 RX ORDER — CHOLESTYRAMINE 4 G/5.5G
4 POWDER, FOR SUSPENSION ORAL
Qty: 239 | Refills: 0 | Status: COMPLETED | COMMUNITY
Start: 2023-08-07

## 2023-12-04 RX ORDER — CLARITHROMYCIN 500 MG/1
500 TABLET, FILM COATED ORAL
Qty: 28 | Refills: 0 | Status: COMPLETED | COMMUNITY
Start: 2023-08-21

## 2023-12-04 RX ORDER — OFLOXACIN 3 MG/ML
0.3 SOLUTION/ DROPS OPHTHALMIC
Qty: 5 | Refills: 0 | Status: COMPLETED | COMMUNITY
Start: 2023-08-07

## 2023-12-04 RX ORDER — OMEPRAZOLE 20 MG/1
20 CAPSULE, DELAYED RELEASE ORAL
Qty: 28 | Refills: 0 | Status: COMPLETED | COMMUNITY
Start: 2023-08-21

## 2023-12-04 RX ORDER — METRONIDAZOLE 250 MG/1
250 TABLET ORAL
Qty: 56 | Refills: 0 | Status: COMPLETED | COMMUNITY
Start: 2023-06-07

## 2023-12-04 RX ORDER — PREDNISOLONE ACETATE 10 MG/ML
1 SUSPENSION/ DROPS OPHTHALMIC
Qty: 5 | Refills: 0 | Status: COMPLETED | COMMUNITY
Start: 2023-09-20

## 2024-01-10 ENCOUNTER — RESULT REVIEW (OUTPATIENT)
Age: 69
End: 2024-01-10

## 2024-01-10 ENCOUNTER — APPOINTMENT (OUTPATIENT)
Dept: NUCLEAR MEDICINE | Facility: IMAGING CENTER | Age: 69
End: 2024-01-10
Payer: MEDICARE

## 2024-01-10 ENCOUNTER — OUTPATIENT (OUTPATIENT)
Dept: OUTPATIENT SERVICES | Facility: HOSPITAL | Age: 69
LOS: 1 days | End: 2024-01-10
Payer: MEDICARE

## 2024-01-10 DIAGNOSIS — G31.09 OTHER FRONTOTEMPORAL NEUROCOGNITIVE DISORDER: ICD-10-CM

## 2024-01-10 PROCEDURE — 78608 BRAIN IMAGING (PET): CPT | Mod: 26,MH

## 2024-01-10 PROCEDURE — 78999 UNLISTED MISC PX DX NUC MED: CPT | Mod: 26,MH

## 2024-01-10 PROCEDURE — 78999 UNLISTED MISC PX DX NUC MED: CPT

## 2024-01-10 PROCEDURE — 78608 BRAIN IMAGING (PET): CPT

## 2024-01-10 PROCEDURE — A9552: CPT

## 2024-01-31 ENCOUNTER — TRANSCRIPTION ENCOUNTER (OUTPATIENT)
Age: 69
End: 2024-01-31

## 2024-05-06 ENCOUNTER — TRANSCRIPTION ENCOUNTER (OUTPATIENT)
Age: 69
End: 2024-05-06

## 2024-05-08 ENCOUNTER — NON-APPOINTMENT (OUTPATIENT)
Age: 69
End: 2024-05-08

## 2024-06-21 ENCOUNTER — OUTPATIENT (OUTPATIENT)
Dept: OUTPATIENT SERVICES | Facility: HOSPITAL | Age: 69
LOS: 1 days | End: 2024-06-21
Payer: MEDICARE

## 2024-06-21 ENCOUNTER — APPOINTMENT (OUTPATIENT)
Dept: MAMMOGRAPHY | Facility: IMAGING CENTER | Age: 69
End: 2024-06-21
Payer: MEDICARE

## 2024-06-21 DIAGNOSIS — Z00.8 ENCOUNTER FOR OTHER GENERAL EXAMINATION: ICD-10-CM

## 2024-06-21 PROCEDURE — 77067 SCR MAMMO BI INCL CAD: CPT

## 2024-06-21 PROCEDURE — 77063 BREAST TOMOSYNTHESIS BI: CPT | Mod: 26

## 2024-06-21 PROCEDURE — 77067 SCR MAMMO BI INCL CAD: CPT | Mod: 26

## 2024-06-21 PROCEDURE — 77063 BREAST TOMOSYNTHESIS BI: CPT

## 2024-08-08 NOTE — ED ADULT NURSE NOTE - CAS EDN DISCHARGE INTERVENTIONS
Airway  Urgency: elective    Date/Time: 8/8/2024 7:33 AM  Airway not difficult    General Information and Staff    Patient location during procedure: OR  Anesthesiologist: Mitchel Agustin MD    Indications and Patient Condition  Indications for airway management: airway protection    Preoxygenated: yes  MILS not maintained throughout  Mask difficulty assessment: 1 - vent by mask    Final Airway Details  Final airway type: endotracheal airway      Successful airway: ETT  Cuffed: yes   Successful intubation technique: direct laryngoscopy  Facilitating devices/methods: anterior pressure/BURP  Endotracheal tube insertion site: oral  Blade: Brock  Blade size: 4  ETT size (mm): 7.5  Cormack-Lehane Classification: grade I - full view of glottis  Placement verified by: capnometry and palpation of cuff   Measured from: lips  ETT/EBT  to lips (cm): 22  Number of attempts at approach: 1  Assessment: lips, teeth, and gum same as pre-op and atraumatic intubation    Additional Comments  ASA monitors applied; preoxygenated with 100% FiO2 via anesthesia face mask; induction of general anesthesia; bag-mask ventilation; patient's position optimized; laryngoscopy; thyroid cartilage pressure applied to enhance view of glottis; cuffed ETT lubricated and correctly placed into the trachea; cuff inflated to seal with minimally occlusive airway cuff pressure; ETT connected to anesthesia circuit; atraumatic/dentition in preoperative condition; ETT well secured in place; correct placement in the trachea confirmed by bilateral chest rise, tube condensation, and return of EtCO2 > 30 mmHg x3           IV discontinued, cath removed intact

## 2024-11-26 ENCOUNTER — APPOINTMENT (OUTPATIENT)
Dept: NEUROLOGY | Facility: CLINIC | Age: 69
End: 2024-11-26
Payer: MEDICARE

## 2024-11-26 VITALS
HEART RATE: 108 BPM | SYSTOLIC BLOOD PRESSURE: 171 MMHG | WEIGHT: 153 LBS | DIASTOLIC BLOOD PRESSURE: 107 MMHG | BODY MASS INDEX: 26.12 KG/M2 | HEIGHT: 64 IN

## 2024-11-26 DIAGNOSIS — G30.1 ALZHEIMER'S DISEASE WITH LATE ONSET: ICD-10-CM

## 2024-11-26 DIAGNOSIS — I10 ESSENTIAL (PRIMARY) HYPERTENSION: ICD-10-CM

## 2024-11-26 DIAGNOSIS — F02.80 ALZHEIMER'S DISEASE WITH LATE ONSET: ICD-10-CM

## 2024-11-26 PROCEDURE — G2211 COMPLEX E/M VISIT ADD ON: CPT

## 2024-11-26 PROCEDURE — 99214 OFFICE O/P EST MOD 30 MIN: CPT

## 2024-11-26 RX ORDER — DONEPEZIL HYDROCHLORIDE 5 MG/1
5 TABLET, FILM COATED ORAL
Qty: 30 | Refills: 2 | Status: ACTIVE | COMMUNITY
Start: 2024-11-26

## 2024-11-26 RX ORDER — AMLODIPINE BESYLATE 2.5 MG/1
2.5 TABLET ORAL DAILY
Refills: 0 | Status: ACTIVE | COMMUNITY
Start: 2024-11-26

## 2025-02-10 ENCOUNTER — NON-APPOINTMENT (OUTPATIENT)
Age: 70
End: 2025-02-10

## 2025-02-13 ENCOUNTER — APPOINTMENT (OUTPATIENT)
Dept: GASTROENTEROLOGY | Facility: CLINIC | Age: 70
End: 2025-02-13

## 2025-02-13 VITALS
HEIGHT: 64 IN | DIASTOLIC BLOOD PRESSURE: 90 MMHG | SYSTOLIC BLOOD PRESSURE: 169 MMHG | HEART RATE: 92 BPM | WEIGHT: 155 LBS | BODY MASS INDEX: 26.46 KG/M2

## 2025-02-13 DIAGNOSIS — A04.8 OTHER SPECIFIED BACTERIAL INTESTINAL INFECTIONS: ICD-10-CM

## 2025-02-13 DIAGNOSIS — R14.0 ABDOMINAL DISTENSION (GASEOUS): ICD-10-CM

## 2025-02-13 PROCEDURE — 99203 OFFICE O/P NEW LOW 30 MIN: CPT

## 2025-02-17 NOTE — ED ADULT NURSE NOTE - RESPIRATORY ASSESSMENT
[FreeTextEntry1] : Patient is now being evaluated for a possible lung transplant.  He is in the process of this evaluation having completed numerous rounds of testing at [facility] including right and left heart cardiac catheterization, echocardiography, lower extremity arterial and venous duplex upper extremity arterial duplex and carotid duplex.  He states that what brought him to this point is that his quality of life has substantially deteriorated in the last 1 to 2 years such that he cannot even attend to basic activities of daily living without having significant dyspnea and limitation. Does not check his blood pressures at home.  Has chronic bilateral pretibial and ankle edema.  WDL

## 2025-02-19 LAB — H PYLORI AG STL QL: NEGATIVE

## 2025-02-24 ENCOUNTER — APPOINTMENT (OUTPATIENT)
Dept: NEUROLOGY | Facility: CLINIC | Age: 70
End: 2025-02-24

## 2025-03-03 ENCOUNTER — NON-APPOINTMENT (OUTPATIENT)
Age: 70
End: 2025-03-03

## 2025-03-03 ENCOUNTER — APPOINTMENT (OUTPATIENT)
Dept: NEUROLOGY | Facility: CLINIC | Age: 70
End: 2025-03-03
Payer: MEDICARE

## 2025-03-03 VITALS
BODY MASS INDEX: 27.31 KG/M2 | HEIGHT: 64 IN | WEIGHT: 160 LBS | DIASTOLIC BLOOD PRESSURE: 90 MMHG | HEART RATE: 93 BPM | SYSTOLIC BLOOD PRESSURE: 151 MMHG

## 2025-03-03 DIAGNOSIS — F02.80 ALZHEIMER'S DISEASE WITH LATE ONSET: ICD-10-CM

## 2025-03-03 DIAGNOSIS — G31.84 MILD COGNITIVE IMPAIRMENT, SO STATED: ICD-10-CM

## 2025-03-03 DIAGNOSIS — G30.1 ALZHEIMER'S DISEASE WITH LATE ONSET: ICD-10-CM

## 2025-03-03 PROCEDURE — 99214 OFFICE O/P EST MOD 30 MIN: CPT

## 2025-03-19 ENCOUNTER — APPOINTMENT (OUTPATIENT)
Dept: RADIOLOGY | Facility: IMAGING CENTER | Age: 70
End: 2025-03-19

## 2025-03-19 ENCOUNTER — APPOINTMENT (OUTPATIENT)
Dept: MRI IMAGING | Facility: IMAGING CENTER | Age: 70
End: 2025-03-19
Payer: MEDICARE

## 2025-03-19 ENCOUNTER — OUTPATIENT (OUTPATIENT)
Dept: OUTPATIENT SERVICES | Facility: HOSPITAL | Age: 70
LOS: 1 days | End: 2025-03-19
Payer: MEDICARE

## 2025-03-19 DIAGNOSIS — G31.84 MILD COGNITIVE IMPAIRMENT OF UNCERTAIN OR UNKNOWN ETIOLOGY: ICD-10-CM

## 2025-03-19 PROCEDURE — 77080 DXA BONE DENSITY AXIAL: CPT

## 2025-03-19 PROCEDURE — 76377 3D RENDER W/INTRP POSTPROCES: CPT

## 2025-03-19 PROCEDURE — 77080 DXA BONE DENSITY AXIAL: CPT | Mod: 26

## 2025-03-19 PROCEDURE — 76377 3D RENDER W/INTRP POSTPROCES: CPT | Mod: 26

## 2025-03-19 PROCEDURE — 70551 MRI BRAIN STEM W/O DYE: CPT | Mod: 26

## 2025-03-19 PROCEDURE — 70551 MRI BRAIN STEM W/O DYE: CPT

## 2025-03-24 ENCOUNTER — APPOINTMENT (OUTPATIENT)
Dept: NEUROLOGY | Facility: CLINIC | Age: 70
End: 2025-03-24

## 2025-03-24 PROCEDURE — 96136 PSYCL/NRPSYC TST PHY/QHP 1ST: CPT

## 2025-03-24 PROCEDURE — 96137 PSYCL/NRPSYC TST PHY/QHP EA: CPT

## 2025-03-24 PROCEDURE — 96116 NUBHVL XM PHYS/QHP 1ST HR: CPT

## 2025-03-24 PROCEDURE — 96133 NRPSYC TST EVAL PHYS/QHP EA: CPT

## 2025-03-24 PROCEDURE — 96132 NRPSYC TST EVAL PHYS/QHP 1ST: CPT

## 2025-05-19 DIAGNOSIS — G30.1 ALZHEIMER'S DISEASE WITH LATE ONSET: ICD-10-CM

## 2025-05-19 DIAGNOSIS — F02.80 ALZHEIMER'S DISEASE WITH LATE ONSET: ICD-10-CM

## 2025-05-19 DIAGNOSIS — G31.84 MILD COGNITIVE IMPAIRMENT, SO STATED: ICD-10-CM

## 2025-06-03 DIAGNOSIS — Z15.89 GENETIC SUSCEPTIBILITY TO OTHER DISEASE: ICD-10-CM

## 2025-07-03 ENCOUNTER — APPOINTMENT (OUTPATIENT)
Dept: MAMMOGRAPHY | Facility: IMAGING CENTER | Age: 70
End: 2025-07-03
Payer: MEDICARE

## 2025-07-03 ENCOUNTER — OUTPATIENT (OUTPATIENT)
Dept: OUTPATIENT SERVICES | Facility: HOSPITAL | Age: 70
LOS: 1 days | End: 2025-07-03
Payer: MEDICARE

## 2025-07-03 DIAGNOSIS — Z00.8 ENCOUNTER FOR OTHER GENERAL EXAMINATION: ICD-10-CM

## 2025-07-03 PROCEDURE — 77063 BREAST TOMOSYNTHESIS BI: CPT

## 2025-07-03 PROCEDURE — 77063 BREAST TOMOSYNTHESIS BI: CPT | Mod: 26

## 2025-07-03 PROCEDURE — 77067 SCR MAMMO BI INCL CAD: CPT | Mod: 26

## 2025-07-03 PROCEDURE — 77067 SCR MAMMO BI INCL CAD: CPT

## 2025-08-04 ENCOUNTER — APPOINTMENT (OUTPATIENT)
Dept: ULTRASOUND IMAGING | Facility: IMAGING CENTER | Age: 70
End: 2025-08-04
Payer: MEDICARE

## 2025-08-04 ENCOUNTER — APPOINTMENT (OUTPATIENT)
Dept: MAMMOGRAPHY | Facility: IMAGING CENTER | Age: 70
End: 2025-08-04
Payer: MEDICARE

## 2025-08-04 ENCOUNTER — OUTPATIENT (OUTPATIENT)
Dept: OUTPATIENT SERVICES | Facility: HOSPITAL | Age: 70
LOS: 1 days | End: 2025-08-04
Payer: MEDICARE

## 2025-08-04 DIAGNOSIS — Z00.8 ENCOUNTER FOR OTHER GENERAL EXAMINATION: ICD-10-CM

## 2025-08-04 PROCEDURE — G0279: CPT | Mod: 26

## 2025-08-04 PROCEDURE — 76642 ULTRASOUND BREAST LIMITED: CPT | Mod: 26,RT

## 2025-08-04 PROCEDURE — 77065 DX MAMMO INCL CAD UNI: CPT | Mod: 26,RT

## 2025-08-04 PROCEDURE — 77065 DX MAMMO INCL CAD UNI: CPT

## 2025-08-04 PROCEDURE — G0279: CPT

## 2025-08-04 PROCEDURE — 76642 ULTRASOUND BREAST LIMITED: CPT

## 2025-09-02 ENCOUNTER — APPOINTMENT (OUTPATIENT)
Dept: NEUROLOGY | Facility: CLINIC | Age: 70
End: 2025-09-02

## 2025-09-02 VITALS
DIASTOLIC BLOOD PRESSURE: 97 MMHG | SYSTOLIC BLOOD PRESSURE: 159 MMHG | HEART RATE: 99 BPM | WEIGHT: 161 LBS | BODY MASS INDEX: 27.49 KG/M2 | HEIGHT: 64 IN

## 2025-09-02 DIAGNOSIS — Z15.89 GENETIC SUSCEPTIBILITY TO OTHER DISEASE: ICD-10-CM

## 2025-09-02 DIAGNOSIS — F02.80 ALZHEIMER'S DISEASE WITH LATE ONSET: ICD-10-CM

## 2025-09-02 DIAGNOSIS — G31.84 MILD COGNITIVE IMPAIRMENT, SO STATED: ICD-10-CM

## 2025-09-02 DIAGNOSIS — G31.09 OTHER FRONTOTEMPORAL DEMENTIA: ICD-10-CM

## 2025-09-02 DIAGNOSIS — F02.A0 OTHER FRONTOTEMPORAL DEMENTIA: ICD-10-CM

## 2025-09-02 DIAGNOSIS — G30.1 ALZHEIMER'S DISEASE WITH LATE ONSET: ICD-10-CM

## 2025-09-02 PROCEDURE — 99214 OFFICE O/P EST MOD 30 MIN: CPT

## 2025-09-14 PROBLEM — T80.90XA INFUSION REACTION: Status: ACTIVE | Noted: 2025-09-14

## 2025-09-14 PROBLEM — G30.9 ALZHEIMER DISEASE: Status: ACTIVE | Noted: 2025-09-14

## 2025-09-17 ENCOUNTER — NON-APPOINTMENT (OUTPATIENT)
Age: 70
End: 2025-09-17